# Patient Record
(demographics unavailable — no encounter records)

---

## 2025-06-25 NOTE — PHYSICAL EXAM
[No Acute Distress] : no acute distress [Normal Sclera/Conjunctiva] : normal sclera/conjunctiva [Normal Outer Ear/Nose] : the outer ears and nose were normal in appearance [No JVD] : no jugular venous distention [No Respiratory Distress] : no respiratory distress  [No Accessory Muscle Use] : no accessory muscle use [Soft] : abdomen soft [Normal] : affect was normal and insight and judgment were intact [Comprehensive Foot Exam Normal] : Right and left foot were examined and both feet are normal. No ulcers in either foot. Toes are normal and with full ROM.  Normal tactile sensation with monofilament testing throughout both feet

## 2025-06-27 NOTE — PLAN
[FreeTextEntry1] : - follow up with neurologist in 1-2 weeks. Continue taking medications as prescribed. Monitor your blood pressure. Reduce fat, cholesterol and salt in your diet. Increase intake of fruits and vegetables. Limit alcohol to minimum and do not smoke. You may be at risk for falling, make changes to your home to help you walk easier. Keep up to date on vaccinations.  If you experience any symptoms of facial drooping, slurred speech, arm or leg weakness, severe headache, vision changes or any worsening symptoms, notify provider immediately and return to ER. -keep f/u appt at [facility] 7/14

## 2025-06-27 NOTE — HISTORY OF PRESENT ILLNESS
[FreeTextEntry1] : F/u post hospitalization at [facility] from 6/16-20 for vert occlusion [de-identified] : 49yo male with HTN, DM2 presented as a tx from [facility] with left sided hemiparesis, numbness, dysarthria, left facial droop. LKW 11:30PM 6/15 when patient went to sleep. Woke up around 2am with above deficits, went back to sleep, then woke up around 7am with deficits persisting, voice was hoarse and he felt dizzy. Denied difficulty swallowing, vision changes. Denies smoking cigarettes and drug use. Occasionally drinks alcohol. No family history of strokes.  Impression: Dizziness, left hemiparesis/numbness, ataxia, hoarseness due to right basal ganglia/corona radiata infarct. Mechanism  vs ESUS.  Televisit made with pt and [facility]  #[id number] Asya. Pt is alert and oriented x4, able to speak in full sentences. No facial drooping still has residual weakness to lt side but much improved. He denies cp, sob, edema, s/s hypo/hyperglycemia. Reports appetite intact, no falls. Pt has all medications and reports he is taking them. Blood glucose 117mg/dl before breakfast. Homecare services in place RN, SW, PT Pt to f/u with PCP at [facility] .

## 2025-06-27 NOTE — INTERPRETER SERVICES
[[facility] ] : provided by [employer] [facility]   [Time Spent: ____ minutes] : Total time spent using  services: [unfilled] minutes. The patient's primary language is not English thus required  services. [Interpreters_IDNumber] : [id number] [Interpreters_FullName] : Asya [TWNoteComboBox1] : Citizen of [place]

## 2025-06-27 NOTE — ASSESSMENT
[FreeTextEntry1] : 51yo male with HTN, DM2 presented as a tx from [facility] with left sided hemiparesis, numbness, dysarthria, left facial droop. LKW 11:30PM 6/15 when patient went to sleep. Woke up around 2am with above deficits, went back to sleep, then woke up around 7am with deficits persisting, voice was hoarse and he felt dizzy.  Dizziness, left hemiparesis/numbness, ataxia, hoarseness due to right basal ganglia/corona radiata infarct.

## 2025-06-27 NOTE — REVIEW OF SYSTEMS
[Fatigue] : fatigue [Muscle Weakness] : muscle weakness [Unsteady Walk] : ataxia [Negative] : Heme/Lymph [Fever] : no fever [Chills] : no chills [Vision Problems] : no vision problems [Hearing Loss] : no hearing loss [Chest Pain] : no chest pain [Lower Ext Edema] : no lower extremity edema [Shortness Of Breath] : no shortness of breath [Abdominal Pain] : no abdominal pain [Nausea] : no nausea [Constipation] : no constipation [Diarrhea] : no diarrhea [Incontinence] : no incontinence [Joint Pain] : no joint pain [Joint Stiffness] : no joint stiffness [Muscle Pain] : no muscle pain [Back Pain] : no back pain [Joint Swelling] : no joint swelling [Itching] : no itching [Skin Rash] : no skin rash [Insomnia] : no insomnia [Anxiety] : no anxiety [Depression] : no depression [de-identified] : has cane and walker does not use

## 2025-06-27 NOTE — ASSESSMENT
[FreeTextEntry1] : 51yo male with HTN, DM2 presented as a tx from [facility] with left sided hemiparesis, numbness, dysarthria, left facial droop. LKW 11:30PM 6/15 when patient went to sleep. Woke up around 2am with above deficits, went back to sleep, then woke up around 7am with deficits persisting, voice was hoarse and he felt dizzy.  Dizziness, left hemiparesis/numbness, ataxia, hoarseness due to right basal ganglia/corona radiata infarct.

## 2025-06-27 NOTE — INTERPRETER SERVICES
[[facility] ] : provided by [employer] [facility]   [Time Spent: ____ minutes] : Total time spent using  services: [unfilled] minutes. The patient's primary language is not English thus required  services. [Interpreters_IDNumber] : [id number] [Interpreters_FullName] : Asya [TWNoteComboBox1] : [place]cuadorean

## 2025-06-27 NOTE — PLAN
[FreeTextEntry1] : - follow up with neurologist in 1-2 weeks. Continue taking medications as prescribed. Monitor your blood pressure. Reduce fat, cholesterol and salt in your diet. Increase intake of fruits and vegetables. Limit alcohol to minimum and do not smoke. You may be at risk for falling, make changes to your home to help you walk easier. Keep up to date on vaccinations.  If you experience any symptoms of facial drooping, slurred speech, arm or leg weakness, severe headache, vision changes or any worsening symptoms, notify provider immediately and return to ER. -keep f/u appt at [facility] 7/14

## 2025-06-27 NOTE — HISTORY OF PRESENT ILLNESS
[FreeTextEntry1] : F/u post hospitalization at [facility] from 6/16-20 for vert occlusion [de-identified] : 49yo male with HTN, DM2 presented as a tx from [facility] with left sided hemiparesis, numbness, dysarthria, left facial droop. LKW 11:30PM 6/15 when patient went to sleep. Woke up around 2am with above deficits, went back to sleep, then woke up around 7am with deficits persisting, voice was hoarse and he felt dizzy. Denied difficulty swallowing, vision changes. Denies smoking cigarettes and drug use. Occasionally drinks alcohol. No family history of strokes.  Impression: Dizziness, left hemiparesis/numbness, ataxia, hoarseness due to right basal ganglia/corona radiata infarct. Mechanism  vs ESUS.  Televisit made with pt and [facility]  #[id number] Asya. Pt is alert and oriented x4, able to speak in full sentences. No facial drooping still has residual weakness to lt side but much improved. He denies cp, sob, edema, s/s hypo/hyperglycemia. Reports appetite intact, no falls. Pt has all medications and reports he is taking them. Blood glucose 117mg/dl before breakfast. Homecare services in place RN, SW, PT Pt to f/u with PCP at [facility] .

## 2025-06-27 NOTE — REVIEW OF SYSTEMS
[Fatigue] : fatigue [Muscle Weakness] : muscle weakness [Unsteady Walk] : ataxia [Negative] : Heme/Lymph [Fever] : no fever [Chills] : no chills [Vision Problems] : no vision problems [Hearing Loss] : no hearing loss [Chest Pain] : no chest pain [Lower Ext Edema] : no lower extremity edema [Shortness Of Breath] : no shortness of breath [Abdominal Pain] : no abdominal pain [Nausea] : no nausea [Constipation] : no constipation [Diarrhea] : no diarrhea [Incontinence] : no incontinence [Joint Pain] : no joint pain [Joint Stiffness] : no joint stiffness [Muscle Pain] : no muscle pain [Back Pain] : no back pain [Joint Swelling] : no joint swelling [Itching] : no itching [Skin Rash] : no skin rash [Insomnia] : no insomnia [Anxiety] : no anxiety [Depression] : no depression [de-identified] : has cane and walker does not use